# Patient Record
Sex: MALE | Race: WHITE
[De-identification: names, ages, dates, MRNs, and addresses within clinical notes are randomized per-mention and may not be internally consistent; named-entity substitution may affect disease eponyms.]

---

## 2020-11-04 ENCOUNTER — HOSPITAL ENCOUNTER (OUTPATIENT)
Dept: HOSPITAL 95 - ORD | Age: 65
Discharge: HOME | End: 2020-11-04
Payer: COMMERCIAL

## 2020-11-04 DIAGNOSIS — I25.10: Primary | ICD-10-CM

## 2020-11-04 DIAGNOSIS — Z87.891: ICD-10-CM

## 2020-11-04 DIAGNOSIS — I71.2: ICD-10-CM

## 2020-11-04 DIAGNOSIS — I42.0: ICD-10-CM

## 2020-11-04 NOTE — NUR
DISCHARGE SUMMARY
PT A&OX4, VSS, ESCORTED PT OFF UNIT WITH ALL PERSONAL POSSESSIONS, TO FRIEND
WAITING TO TAKE PT HOME. Patient up to Ambulate independently. Gait steady.
Discharge instructions reviewed with patient. Patient verbalizes understanding.
Copy given to patient to take home. IV DC'D.

## 2022-08-02 ENCOUNTER — HOSPITAL ENCOUNTER (OUTPATIENT)
Dept: HOSPITAL 95 - ORSCMMR | Age: 67
Discharge: HOME | End: 2022-08-02
Attending: INTERNAL MEDICINE
Payer: MEDICARE

## 2022-08-02 VITALS — BODY MASS INDEX: 29.59 KG/M2 | WEIGHT: 230.6 LBS | HEIGHT: 74 IN

## 2022-08-02 DIAGNOSIS — K44.9: ICD-10-CM

## 2022-08-02 DIAGNOSIS — K92.1: Primary | ICD-10-CM

## 2022-08-02 DIAGNOSIS — Z79.899: ICD-10-CM

## 2022-08-02 DIAGNOSIS — Z80.0: ICD-10-CM

## 2022-08-02 DIAGNOSIS — Z87.891: ICD-10-CM

## 2022-08-02 DIAGNOSIS — I48.0: ICD-10-CM

## 2022-08-02 PROCEDURE — 0DJ08ZZ INSPECTION OF UPPER INTESTINAL TRACT, VIA NATURAL OR ARTIFICIAL OPENING ENDOSCOPIC: ICD-10-PCS | Performed by: INTERNAL MEDICINE

## 2022-08-02 NOTE — NUR
08/02/22 1412 Orlando Ascencio
HISTORY, CHART, MEDICATIONS AND ALLERGIES REVIEWED BEFORE START OF
PROCEDURE. PATIENT CONFIRMS NPO STATUS AND AGREES WITH SCHEDULED
PROCEDURE. 3-LEAD EKG REVIEWED WITH PHYSICIAN PRIOR TO START OF
PROCEDURE. MONITOR INTACT WITH CONTINUOUS PULSE OXIMETRY,CAPNOGRAPHY,
3-LEAD EKG, INTERMITTENT BP. SUPPLEMENTAL O2 TO BE TITRATED THROUGHOUT
PROCEDURE TO MAINTAIN O2 SATURATION ABOVE 90%. PATIENT DETERMINED TO
BE ASA APPROPRIATE FOR PROPOFOL SEDATION PRIOR TO START OF PROCEDURE
BY DR. ISLAS

## 2022-08-02 NOTE — NUR
Ambulatory in [facility] warming gown applied History, Chart,
Medications and Allergies reviewed before start of procedure.Lungs clear T/O
to Auscultation.  Pre-Op teaching done. Pt verbalizes understanding.  Patient
confirms NPO status and agrees with scheduled surgery.  Patient States
Post-Procedure ride home has been arranged.

## 2022-08-02 NOTE — NUR
Patient up to Ambulate independently. Gait steady.
Discharge instructions reviewed with patient. Patient verbalizes understanding.
Copy given to patient to take home.AWAITING FOR PT RIDE BEFORE DISCHARGE. WILL
BE Discharged via wheelchair to private car for ride home WHEN RIDE ARRIVES